# Patient Record
Sex: FEMALE | Race: OTHER | URBAN - METROPOLITAN AREA
[De-identification: names, ages, dates, MRNs, and addresses within clinical notes are randomized per-mention and may not be internally consistent; named-entity substitution may affect disease eponyms.]

---

## 2022-01-31 ENCOUNTER — OFFICE VISIT (OUTPATIENT)
Dept: INTERNAL MEDICINE CLINIC | Age: 52
End: 2022-01-31

## 2022-01-31 VITALS
TEMPERATURE: 97.3 F | DIASTOLIC BLOOD PRESSURE: 82 MMHG | OXYGEN SATURATION: 97 % | WEIGHT: 163.8 LBS | HEIGHT: 63 IN | HEART RATE: 79 BPM | SYSTOLIC BLOOD PRESSURE: 128 MMHG | BODY MASS INDEX: 29.02 KG/M2

## 2022-01-31 DIAGNOSIS — K76.0 FATTY LIVER: ICD-10-CM

## 2022-01-31 DIAGNOSIS — Z01.419 PAP TEST, AS PART OF ROUTINE GYNECOLOGICAL EXAMINATION: ICD-10-CM

## 2022-01-31 DIAGNOSIS — R10.10 PAIN OF UPPER ABDOMEN: Primary | ICD-10-CM

## 2022-01-31 DIAGNOSIS — Z12.11 ENCOUNTER FOR SCREENING COLONOSCOPY: ICD-10-CM

## 2022-01-31 PROCEDURE — 1111F DSCHRG MED/CURRENT MED MERGE: CPT | Performed by: HOSPITALIST

## 2022-01-31 PROCEDURE — 99204 OFFICE O/P NEW MOD 45 MIN: CPT | Performed by: HOSPITALIST

## 2022-01-31 RX ORDER — ONDANSETRON 4 MG/1
4 TABLET, ORALLY DISINTEGRATING ORAL EVERY 8 HOURS PRN
COMMUNITY

## 2022-01-31 RX ORDER — DICYCLOMINE HCL 20 MG
20 TABLET ORAL EVERY 6 HOURS
COMMUNITY
End: 2022-01-31 | Stop reason: SDUPTHER

## 2022-01-31 RX ORDER — DICYCLOMINE HCL 20 MG
20 TABLET ORAL EVERY 6 HOURS PRN
Qty: 60 TABLET | Refills: 1 | Status: SHIPPED | OUTPATIENT
Start: 2022-01-31 | End: 2022-04-01

## 2022-01-31 SDOH — ECONOMIC STABILITY: FOOD INSECURITY: WITHIN THE PAST 12 MONTHS, YOU WORRIED THAT YOUR FOOD WOULD RUN OUT BEFORE YOU GOT MONEY TO BUY MORE.: NEVER TRUE

## 2022-01-31 SDOH — ECONOMIC STABILITY: FOOD INSECURITY: WITHIN THE PAST 12 MONTHS, THE FOOD YOU BOUGHT JUST DIDN'T LAST AND YOU DIDN'T HAVE MONEY TO GET MORE.: NEVER TRUE

## 2022-01-31 ASSESSMENT — PATIENT HEALTH QUESTIONNAIRE - PHQ9
SUM OF ALL RESPONSES TO PHQ QUESTIONS 1-9: 0
SUM OF ALL RESPONSES TO PHQ QUESTIONS 1-9: 0
1. LITTLE INTEREST OR PLEASURE IN DOING THINGS: 0
SUM OF ALL RESPONSES TO PHQ9 QUESTIONS 1 & 2: 0
SUM OF ALL RESPONSES TO PHQ QUESTIONS 1-9: 0
2. FEELING DOWN, DEPRESSED OR HOPELESS: 0
SUM OF ALL RESPONSES TO PHQ QUESTIONS 1-9: 0

## 2022-01-31 ASSESSMENT — SOCIAL DETERMINANTS OF HEALTH (SDOH): HOW HARD IS IT FOR YOU TO PAY FOR THE VERY BASICS LIKE FOOD, HOUSING, MEDICAL CARE, AND HEATING?: NOT VERY HARD

## 2022-01-31 NOTE — PROGRESS NOTES
Post-Discharge Transitional Care Management Services or Hospital Follow Up      Dennis Eastman   YOB: 1970    Date of Office Visit:  1/31/2022  Date of Hospital Admission:    Date of Hospital Discharge:    Risk of hospital readmission (high >=14%. Medium >=10%) :No data recorded    Care management risk score Rising risk (score 2-5) and Complex Care (Scores >=6): 0     Non face to face  following discharge, date last encounter closed (first attempt may have been earlier): *No documented post hospital discharge outreach found in the last 14 days    Call initiated 2 business days of discharge: *No response recorded in the last 14 days    There is no problem list on file for this patient. No Known Allergies    Medications listed as ordered at the time of discharge from hospital     Medication List          Accurate as of January 31, 2022  2:34 PM. If you have any questions, ask your nurse or doctor.             CHANGE how you take these medications    dicyclomine 20 MG tablet  Commonly known as: BENTYL  Take 1 tablet by mouth every 6 hours as needed (Abdominal pain)  What changed:   · when to take this  · reasons to take this  Changed by: Leonila Martinez MD        CONTINUE taking these medications    ondansetron 4 MG disintegrating tablet  Commonly known as: ZOFRAN-ODT           Where to Get Your Medications      These medications were sent to [address]    Phone: [phone]   · dicyclomine 20 MG tablet           Medications marked \"taking\" at this time  Outpatient Medications Marked as Taking for the 1/31/22 encounter (Office Visit) with Moshe Rivero MD   Medication Sig Dispense Refill    ondansetron (ZOFRAN-ODT) 4 MG disintegrating tablet Take 4 mg by mouth every 8 hours as needed for Nausea or Vomiting      dicyclomine (BENTYL) 20 MG tablet Take 1 tablet by mouth every 6 hours as needed (Abdominal pain) 60 tablet 1        Medications patient taking as of now reconciled against medications ordered at time of hospital discharge: Yes    Chief Complaint   Patient presents with   [address] 61 from Hospital       History of Present illness     -All history was obtained via interpretor.      - Follow up of Hospital diagnosis(es):   The patient was recently seen at [facility] ED for non-radiating, diffuse upper abdominal pressure and low back pain. She denies N/V, constipation, or diarrhea. No blood in the stool. No problems urinating. No acute etiology was found at [facility]. She was prescribed Zofran and bentyl. These were effective. The problem is significantly improved. -Hx of fatty liver. Was seeing [facility] in the past.  Weight has been stable. Inpatient course: Discharge summary reviewed- see chart. Interval history/Current status: Overall doing well. A comprehensive review of systems was negative except for what was noted in the HPI. Vitals:    01/31/22 1421   BP: 128/82   Site: Left Upper Arm   Position: Sitting   Cuff Size: Medium Adult   Pulse: 79   Temp: 97.3 °F (36.3 °C)   SpO2: 97%   Weight: 163 lb 12.8 oz (74.3 kg)   Height: 5' 3\" (1.6 m)     Body mass index is 29.02 kg/m².    Wt Readings from Last 3 Encounters:   01/31/22 163 lb 12.8 oz (74.3 kg)     BP Readings from Last 3 Encounters:   01/31/22 128/82        Physical Exam:  General Appearance: alert and oriented to person, place and time, well developed and well- nourished, in no acute distress  Skin: warm and dry, no rash or erythema  Head: normocephalic and atraumatic  Eyes: pupils equal, round, and reactive to light, extraocular eye movements intact, conjunctivae normal  ENT: tympanic membrane, external ear and ear canal normal bilaterally, nose without deformity, nasal mucosa and turbinates normal without polyps  Neck: supple and non-tender without mass, no thyromegaly or thyroid nodules, no cervical lymphadenopathy  Pulmonary/Chest: clear to auscultation bilaterally- no wheezes, rales or rhonchi, normal air movement, no respiratory distress  Cardiovascular: normal rate, regular rhythm, normal S1 and S2, no murmurs, rubs, clicks, or gallops, distal pulses intact, no carotid bruits  Abdomen: soft, non-tender, non-distended, normal bowel sounds, no masses or organomegaly  Extremities: no cyanosis, clubbing or edema  Musculoskeletal: normal range of motion, no joint swelling, deformity or tenderness  Neurologic: reflexes normal and symmetric, no cranial nerve deficit, gait, coordination and speech normal    Assessment/Plan:  1. Pain of upper abdomen  - dicyclomine (BENTYL) 20 MG tablet; Take 1 tablet by mouth every 6 hours as needed (Abdominal pain)  Dispense: 60 tablet; Refill: 1  - CBC; Future  - COMPREHENSIVE METABOLIC PANEL; Future  - Lipid, Fasting; Future  - HIV-1 AND HIV-2 ANTIBODIES; Future  - HEPATITIS C ANTIBODY; Future    2. Fatty liver  -Labs today as above. Screening colonoscopy ordered. OBGYN Consult ordered as well.              Medical Decision Making: moderate complexity